# Patient Record
Sex: FEMALE | Race: WHITE | Employment: FULL TIME | URBAN - METROPOLITAN AREA
[De-identification: names, ages, dates, MRNs, and addresses within clinical notes are randomized per-mention and may not be internally consistent; named-entity substitution may affect disease eponyms.]

---

## 2017-06-20 ENCOUNTER — NURSE ONLY (OUTPATIENT)
Dept: FAMILY MEDICINE CLINIC | Facility: CLINIC | Age: 42
End: 2017-06-20

## 2017-06-20 DIAGNOSIS — Z11.1 SCREENING FOR TUBERCULOSIS: Primary | ICD-10-CM

## 2017-06-20 PROCEDURE — 86580 TB INTRADERMAL TEST: CPT | Performed by: PHYSICIAN ASSISTANT

## 2017-06-20 RX ORDER — LOSARTAN POTASSIUM 50 MG/1
TABLET ORAL DAILY
COMMUNITY

## 2017-06-20 NOTE — PATIENT INSTRUCTIONS
You will need to return to clinic in 48-72 hours to have results of TB test read. Please return to clinic on 06/22/2017 after 11:40 or on 6/23/2017 before 11:40 to have your TB test read.     If you do not return during this time your test will need to

## 2017-06-20 NOTE — PROGRESS NOTES
Sapna Rossi is a 43year old female who presents requesting single step ppD. Employed as RN, needs annual ppD. Previous ppDs all negative, denies previous h/o reaction.   TST questionnaire completed by pt and reviewed, no contraindications to ppD plac

## 2017-06-22 ENCOUNTER — OFFICE VISIT (OUTPATIENT)
Dept: FAMILY MEDICINE CLINIC | Facility: CLINIC | Age: 42
End: 2017-06-22

## 2017-06-22 DIAGNOSIS — Z11.1 SCREENING-PULMONARY TB: Primary | ICD-10-CM

## 2017-06-22 NOTE — PROGRESS NOTES
Return for TST interpretation, 0.0 mm induration, neg TST. See Epic.    Loretta Velasco, 06/22/2017, 12:27 PM

## 2017-06-22 NOTE — PATIENT INSTRUCTIONS
06/22/2017    Kala Edvin  4/26/1975  [facility] [place] [id number]        This document is to verify Kala Pardo had a TB skin test completed and the results are: negative.     Date given: 6/20/2017  Date read: 06/22/2017          Please call t

## (undated) NOTE — MR AVS SNAPSHOT
[address] [facility]  [place] [id number] [phone]               Thank you for choosing us for your health care visit with Chelsea Zhou PA-C.   We are glad to serve you and happy to provide you with your Zip Code and Date of Birth to complete the sign-up process. If you do not sign up before the expiration date, you must request a new code.     Your unique [employer] Access Code: [email or web address]  Expires: 8/19/2017 11:43 AM    If you have questions, you can c

## (undated) NOTE — MR AVS SNAPSHOT
[address] [facility]  [place] [facility] [address]  [phone]               Thank you for choosing us for your health care visit with Norman Lama PA-C.   We are glad to serve you and happy to provide you with information, go to [email or web address] and click on the Sign Up Now link in the [employer] box. Enter your [employer] Activation Code exactly as it appears below along with your Zip Code and Date of Birth to complete the sign-up process.  If you do You don’t need to join a gym. Home exercises work great.  Put more priority on exercise in your life                    Visit [facility] online at  [email or web address]